# Patient Record
Sex: MALE | Race: WHITE | Employment: UNEMPLOYED | URBAN - METROPOLITAN AREA
[De-identification: names, ages, dates, MRNs, and addresses within clinical notes are randomized per-mention and may not be internally consistent; named-entity substitution may affect disease eponyms.]

---

## 2021-01-25 NOTE — PROGRESS NOTES
Chief Complaint - Neck mass    History of Present Illness - Efrain Mccollum is a 56 year old male with a left neck mass. It has been present for 2 months. The patient notes mild tenderness or pain. No changes in size. He feels it is more crowded in throat. The patient denies any facial numbness, history of skin cancer or any cancer. No dysphagia, odynphagia, or hoarseness. The mass does not change with eating. No obvious signs of infection including fluctuating size, redness, or purulent drainage. Treatments have included some anti-inflammatories. The patient is a former smoker, and used chew as a kid, nothing recently. I personally reviewed the relevant clinical notes in Epic including the primary care providers note. CT neck 1/4/2021 at [place] said no abnormality specifically with the major salivary glands. He had an U/S as well that noted a left In the area of palpable concern within left cervical level 2, there is a 1.8 x 0.7 x 0.8 cm lymph node with preserved fatty hilum.    Past Medical History -   - some osteoarthritis    Allergies - No Known Allergies    Social History -   Social History     Socioeconomic History     Marital status:      Spouse name: Not on file     Number of children: Not on file     Years of education: Not on file     Highest education level: Not on file   Occupational History     Not on file   Social Needs     Financial resource strain: Not on file     Food insecurity     Worry: Not on file     Inability: Not on file     Transportation needs     Medical: Not on file     Non-medical: Not on file   Tobacco Use     Smoking status: Not on file   Substance and Sexual Activity     Alcohol use: Not on file     Drug use: Not on file     Sexual activity: Not on file   Lifestyle     Physical activity     Days per week: Not on file     Minutes per session: Not on file     Stress: Not on file   Relationships     Social connections     Talks on phone: Not on file     Gets together: Not on  file     Attends [employer] service: Not on file     Active member of club or organization: Not on file     Attends meetings of clubs or organizations: Not on file     Relationship status: Not on file     Intimate partner violence     Fear of current or ex partner: Not on file     Emotionally abused: Not on file     Physically abused: Not on file     Forced sexual activity: Not on file   Other Topics Concern     Not on file   Social History Narrative     Not on file     Review of Systems - As per HPI and PMHx, otherwise 7 system review of the head and neck is negative.    Physical Exam  /87   Pulse 64   SpO2 98%   General - The patient is in no distress.  Alert and oriented x3, answers questions and cooperates with examination appropriately.   Voice and Breathing - The patient was breathing comfortably without the use of accessory muscles. There was no wheezing, stridor, or stertor.  The patients voice was clear and strong.  Eyes - Extraocular movements intact. Sclera were not icteric or injected, conjunctiva were pink and moist.  Neurologic - Cranial nerves II-XII are grossly intact. Specifically, the facial nerve is intact, House-Brackmann grade 1 of 6.   Mouth - Examination of the oral cavity showed pink, healthy oral mucosa. No lesions or ulcerations noted.  The tongue was mobile and protrudes midline, no lesions. Clear saliva comes out of both Warthon's ducts.  Oropharynx - The walls of the oropharynx were smooth, symmetric, and had no lesions or ulcerations.  The tonsils were without masses or ulcerations. The uvula was midline and the palate raised symmetrically.   Neck - Palpation of the left submandibular neck reveals a normal appearing submandibular gland. It does hang lower and/or is larger than the right submandibular gland. It is mobile, but firm. Some tenderness. No overlying skin changes. No fluctuance. No obvious mass.  I cannot tell if there is any adjacent lymph node to this or on it that  makes it feel like it is enlarged.  It is not firm to suggest infection or sialadenitis.  The other occipital, submental, submandibular, internal jugular chain, and supraclavicular chains did not demonstrate any abnormal lymph nodes or masses. No parotid masses. Palpation of the thyroid was soft and smooth, with no nodules or goiter appreciated.  The trachea was midline.        A/P - Efrain Mccollum is a 56 year old male with asymmetric submandibular enlargement and pain.  This was on the left side.  I do feel both submandibular glands in his left does feel slightly bigger than the right.  I cannot explain his the pain.  I do not appreciate any stone as he has normal saliva flow.  He also had a CT neck and ultrasound elsewhere that showed no obvious stone or submandibular abnormality or mass.  I only reviewed the report and happy to review the actual images.  He will get a CD with CT neck images for me and bring it to me to review.  The ultrasound showed a normal lymph node in level 2, and no obvious abnormality of the submandibular gland.  Possible he could have a sore swollen lymph node.  Other considerations could be to repeat the CT scan.  I did explain that this could be muscle skeletal.  It is possible the tender lymph node gland may not resolve with medical treatment.  Surgical treatment could be considered but without obvious pathology and may not be warranted.       Paddy Shah MD  Otolaryngology  [facility]

## 2021-01-26 ENCOUNTER — OFFICE VISIT (OUTPATIENT)
Dept: OTOLARYNGOLOGY | Facility: CLINIC | Age: 56
End: 2021-01-26
Payer: COMMERCIAL

## 2021-01-26 VITALS — OXYGEN SATURATION: 98 % | SYSTOLIC BLOOD PRESSURE: 122 MMHG | HEART RATE: 64 BPM | DIASTOLIC BLOOD PRESSURE: 87 MMHG

## 2021-01-26 DIAGNOSIS — K11.8 PAIN OF SUBMANDIBULAR GLAND: Primary | ICD-10-CM

## 2021-01-26 PROCEDURE — 99203 OFFICE O/P NEW LOW 30 MIN: CPT | Performed by: OTOLARYNGOLOGY

## 2021-01-26 NOTE — LETTER
1/26/2021         RE: Efrain Mccollum  [address] [address]        Dear Colleague,    Thank you for referring your patient, Efrain Mccollum, to the [facility]. Please see a copy of my visit note below.    Chief Complaint - Neck mass    History of Present Illness - Efrain Mccollum is a 56 year old male with a left neck mass. It has been present for 2 months. The patient notes mild tenderness or pain. No changes in size. He feels it is more crowded in throat. The patient denies any facial numbness, history of skin cancer or any cancer. No dysphagia, odynphagia, or hoarseness. The mass does not change with eating. No obvious signs of infection including fluctuating size, redness, or purulent drainage. Treatments have included some anti-inflammatories. The patient is a former smoker, and used chew as a kid, nothing recently. I personally reviewed the relevant clinical notes in Epic including the primary care providers note. CT neck 1/4/2021 at [place] said no abnormality specifically with the major salivary glands. He had an U/S as well that noted a left In the area of palpable concern within left cervical level 2, there is a 1.8 x 0.7 x 0.8 cm lymph node with preserved fatty hilum.    Past Medical History -   - some osteoarthritis    Allergies - No Known Allergies    Social History -   Social History     Socioeconomic History     Marital status:      Spouse name: Not on file     Number of children: Not on file     Years of education: Not on file     Highest education level: Not on file   Occupational History     Not on file   Social Needs     Financial resource strain: Not on file     Food insecurity     Worry: Not on file     Inability: Not on file     Transportation needs     Medical: Not on file     Non-medical: Not on file   Tobacco Use     Smoking status: Not on file   Substance and Sexual Activity     Alcohol use: Not on file     Drug use: Not on file      Sexual activity: Not on file   Lifestyle     Physical activity     Days per week: Not on file     Minutes per session: Not on file     Stress: Not on file   Relationships     Social connections     Talks on phone: Not on file     Gets together: Not on file     Attends [employer] service: Not on file     Active member of club or organization: Not on file     Attends meetings of clubs or organizations: Not on file     Relationship status: Not on file     Intimate partner violence     Fear of current or ex partner: Not on file     Emotionally abused: Not on file     Physically abused: Not on file     Forced sexual activity: Not on file   Other Topics Concern     Not on file   Social History Narrative     Not on file     Review of Systems - As per HPI and PMHx, otherwise 7 system review of the head and neck is negative.    Physical Exam  /87   Pulse 64   SpO2 98%   General - The patient is in no distress.  Alert and oriented x3, answers questions and cooperates with examination appropriately.   Voice and Breathing - The patient was breathing comfortably without the use of accessory muscles. There was no wheezing, stridor, or stertor.  The patients voice was clear and strong.  Eyes - Extraocular movements intact. Sclera were not icteric or injected, conjunctiva were pink and moist.  Neurologic - Cranial nerves II-XII are grossly intact. Specifically, the facial nerve is intact, House-Brackmann grade 1 of 6.   Mouth - Examination of the oral cavity showed pink, healthy oral mucosa. No lesions or ulcerations noted.  The tongue was mobile and protrudes midline, no lesions. Clear saliva comes out of both Warthon's ducts.  Oropharynx - The walls of the oropharynx were smooth, symmetric, and had no lesions or ulcerations.  The tonsils were without masses or ulcerations. The uvula was midline and the palate raised symmetrically.   Neck - Palpation of the left submandibular neck reveals a normal appearing submandibular  gland. It does hang lower and/or is larger than the right submandibular gland. It is mobile, but firm. Some tenderness. No overlying skin changes. No fluctuance. No obvious mass.  I cannot tell if there is any adjacent lymph node to this or on it that makes it feel like it is enlarged.  It is not firm to suggest infection or sialadenitis.  The other occipital, submental, submandibular, internal jugular chain, and supraclavicular chains did not demonstrate any abnormal lymph nodes or masses. No parotid masses. Palpation of the thyroid was soft and smooth, with no nodules or goiter appreciated.  The trachea was midline.        A/P - Efrain Mccollum is a 56 year old male with asymmetric submandibular enlargement and pain.  This was on the left side.  I do feel both submandibular glands in his left does feel slightly bigger than the right.  I cannot explain his the pain.  I do not appreciate any stone as he has normal saliva flow.  He also had a CT neck and ultrasound elsewhere that showed no obvious stone or submandibular abnormality or mass.  I only reviewed the report and happy to review the actual images.  He will get a CD with CT neck images for me and bring it to me to review.  The ultrasound showed a normal lymph node in level 2, and no obvious abnormality of the submandibular gland.  Possible he could have a sore swollen lymph node.  Other considerations could be to repeat the CT scan.  I did explain that this could be muscle skeletal.  It is possible the tender lymph node gland may not resolve with medical treatment.  Surgical treatment could be considered but without obvious pathology and may not be warranted.       Paddy Shah MD  Otolaryngology  [facility]        Again, thank you for allowing me to participate in the care of your patient.        Sincerely,        Paddy Shah MD

## 2021-03-15 ENCOUNTER — TELEPHONE (OUTPATIENT)
Dept: OTOLARYNGOLOGY | Facility: CLINIC | Age: 56
End: 2021-03-15

## 2021-03-15 NOTE — TELEPHONE ENCOUNTER
Patient states he requested for [employer] to send a CD with his previous images to Dr. Shah.    Patient wondering if Dr. Shah received this disc? Please call to inform.    Giancarlo Hardwick    [facility]

## 2021-03-16 NOTE — TELEPHONE ENCOUNTER
Fax cover sent to [facility] recs to request imaging be pushed to [facility] pacs system.      Stormy ORDAZ RN Specialty Triage 3/16/2021 11:05 AM

## 2021-03-22 NOTE — TELEPHONE ENCOUNTER
Dr Shah his imaging should be avail geeta in pacs for you to view.    Stormy ORDAZ RN Specialty Triage 3/22/2021 2:55 PM

## 2021-03-24 DIAGNOSIS — M24.20 EAGLE'S SYNDROME: Primary | ICD-10-CM

## 2021-03-24 NOTE — PROGRESS NOTES
CT neck reviewed in our PACS system from [facility]. The patient has an elongated and calcified styloid process on the left essentially going all the way down to his hyoid bone. In further question he gets worse pain with turning or tilting his head. He feels it affects his blood pressure and that maybe compression of his carotid artery. 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 I recommend referral to the  of  head and neck surgery department to discuss surgical options for this. Referral provided.

## 2021-03-25 NOTE — TELEPHONE ENCOUNTER
FUTURE VISIT INFORMATION      FUTURE VISIT INFORMATION:    Date: 4/14/2021    Time: 7AM    [place] [facility] [place] INFORMATION:    Referring provider:  Paddy Shah MD    Referring providers clinic:  [facility]     Reason for visit/diagnosis  [place] Syndrome    RECORDS REQUESTED FROM:       Clinic name Comments Records Status Imaging Status   [facility]  1/26/2021 note from Paddy Shah MD Epic    [facility] [place] Neck  Care everywhere  PACS   [facility]  12/31/2020 note from Esther Webb PA-C   Care Everywhere     [facility]   12/17/2020 note from Eris Pleitez PA-C   Care everywhere

## 2021-03-26 ENCOUNTER — DOCUMENTATION ONLY (OUTPATIENT)
Dept: CARE COORDINATION | Facility: CLINIC | Age: 56
End: 2021-03-26

## 2021-04-12 NOTE — PATIENT INSTRUCTIONS
1. You were seen in the ENT Clinic today by Dr. Varela.  If you have any questions or concerns after your appointment, please call   - Option 1: ENT Clinic: [phone]   - Option 2: Nevaeh (Dr. Varela's Nurse): [phone]     2.   Plan to return to clinic in person    Nevaeh Bustillo LPN  [facility] - Otolaryngology

## 2021-04-14 ENCOUNTER — TELEPHONE (OUTPATIENT)
Dept: OTOLARYNGOLOGY | Facility: CLINIC | Age: 56
End: 2021-04-14

## 2021-04-14 ENCOUNTER — VIRTUAL VISIT (OUTPATIENT)
Dept: OTOLARYNGOLOGY | Facility: CLINIC | Age: 56
End: 2021-04-14
Attending: OTOLARYNGOLOGY
Payer: COMMERCIAL

## 2021-04-14 ENCOUNTER — PRE VISIT (OUTPATIENT)
Dept: OTOLARYNGOLOGY | Facility: CLINIC | Age: 56
End: 2021-04-14

## 2021-04-14 VITALS — WEIGHT: 217 LBS

## 2021-04-14 DIAGNOSIS — M24.20 EAGLE'S SYNDROME: Primary | ICD-10-CM

## 2021-04-14 PROCEDURE — 99213 OFFICE O/P EST LOW 20 MIN: CPT | Mod: 95 | Performed by: OTOLARYNGOLOGY

## 2021-04-14 RX ORDER — CHLORHEXIDINE GLUCONATE ORAL RINSE 1.2 MG/ML
SOLUTION DENTAL
COMMUNITY
Start: 2021-03-16

## 2021-04-14 RX ORDER — IBUPROFEN 400 MG/1
TABLET, FILM COATED ORAL
COMMUNITY
Start: 2020-12-17

## 2021-04-14 RX ORDER — ACETAMINOPHEN 500 MG
TABLET ORAL
COMMUNITY
Start: 2020-12-17

## 2021-04-14 ASSESSMENT — PAIN SCALES - GENERAL: PAINLEVEL: EXTREME PAIN (8)

## 2021-04-14 NOTE — PROGRESS NOTES
Efrain is a 56 year old who is being evaluated via a billable telephone visit.      What phone number would you like to be contacted at? [phone]  How would you like to obtain your AVS? MyChart        HPI         Review of Systems            Physical Exam       See dictation        Phone call duration: 10 minutes

## 2021-04-14 NOTE — PROGRESS NOTES
HISTORY OF PRESENT ILLNESS: Efrain Mccollum a 56-year-old gentleman who was seen by telephone visit today.  His video was not functional.  Therefore we changed this to a phone visit.  The phone visit  took approximately 10 minutes.        Efrain Mccollum has experienced right-sided neck pain and has been seen by an otolaryngologist for this.  CT scan was obtained that showed calcification of the stylohyoid ligament.  This is inconsequential and that it could be [place] syndrome.      He describes the pain as sharp, fleeting and more when swallowing or turning his head.  He did have some associated adenopathy, but this has not been proven to be significantly more related to any intraoral issues.      PAST MEDICAL HISTORY:  Reviewed.      MEDICATIONS:  Reviewed.      ALLERGIES:  REVIEWED.      REVIEW OF SYSTEMS:  Notes mild odynophagia, but no dysphagia.  He has no shortness of breath, fever or chills.      REVIEW OF SYSTEMS:  As above.  He is a nonsmoker and does not drink alcohol.  He does happen to be starting a new job and is worried about time off.      PHYSICAL EXAMINATION:  Deferred as this is a phone call.      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      PLAN:  We will have him visit with us so we can assess diagnostically, exactly the reason for his pain. and review his CT scan. We will schedule accordingly.

## 2021-04-14 NOTE — LETTER
4/14/2021       RE: Efrain Mccollum  [address] [address]     Dear Colleague,    Thank you for referring your patient, Efrain Mccollum, to the [facility] [place] at [facility]. Please see a copy of my visit note below.      Phone call duration: 10 minutes      HISTORY OF PRESENT ILLNESS: Efrain Mccollum a 56-year-old gentleman who was seen by telephone visit today.  His video was not functional.  Therefore we changed this to a phone visit.  The phone visit  took approximately 10 minutes.        Efrain Mccollum has experienced right-sided neck pain and has been seen by an otolaryngologist for this.  CT scan was obtained that showed calcification of the stylohyoid ligament.  This is inconsequential and that it could be [place] syndrome.      He describes the pain as sharp, fleeting and more when swallowing or turning his head.  He did have some associated adenopathy, but this has not been proven to be significantly more related to any intraoral issues.      PAST MEDICAL HISTORY:  Reviewed.      MEDICATIONS:  Reviewed.      ALLERGIES:  REVIEWED.      REVIEW OF SYSTEMS:  Notes mild odynophagia, but no dysphagia.  He has no shortness of breath, fever or chills.      REVIEW OF SYSTEMS:  As above.  He is a nonsmoker and does not drink alcohol.  He does happen to be starting a new job and is worried about time off.      PHYSICAL EXAMINATION:  Deferred as this is a phone call.      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      PLAN:  We will have him visit with us so we can assess diagnostically, exactly the reason for his pain. and review his CT scan. We will schedule accordingly.       Again, thank you for allowing me to participate in the care of your patient.      Sincerely,    Greg Varela MD

## 2021-05-01 ENCOUNTER — HEALTH MAINTENANCE LETTER (OUTPATIENT)
Age: 56
End: 2021-05-01

## 2021-05-10 NOTE — PATIENT INSTRUCTIONS
1. You were seen in the ENT Clinic today by Dr. Varela.  If you have any questions or concerns after your appointment, please call   - Option 1: ENT Clinic: [phone]   - Option 2: Nevaeh (Dr. Varela's Nurse): [phone]     Surgery Teaching      1.You must have a physical exam (called  history and physical ) within 30 days of surgery. You can do this at the PAC clinic or your family clinic.     2.For same-day surgery, you must arrange for an adult to take you home from the Center. An adult must stay with you for the first 24 hours after surgery. You cannot drive for 24 hours.     3. Ask your doctor what medicines are safe before surgery.     4. Stop drinking alcohol at least 24 hours before surgery.     5. Stop or at least cut down on smoking 24 hours before surgery.    6.Take a bath or shower the night before and the morning of surgery (as told by your surgeon). Use an antiseptic soap. If your doctor does not give you special soap, buy Hibiclens or [place]-Stat at the drug store or ask the pharmacist to suggest a brand.  Do not put on lotion, powder, perfume, deodorant or make-up after bathing.    7. You can eat a normal meal the night before surgery. Do not eat any solid foods or drink any milk products for 8 hours before surgery.     8. You may drink clear liquids until 2 hours before surgery. Clear liquids include water, Gatorade, apple juice and liquids you can read through.    9. NO MOTRIN, IBUPROFEN, ASPIRIN, ALEVE, GARLIC SUPPLEMENTS or FISH OIL x 7 days prior to surgery ( to prevent excess bleeding and bruising at time of surgery)      Nevaeh Bustillo LPN  [facility] - Otolaryngology

## 2021-05-12 ENCOUNTER — PREP FOR PROCEDURE (OUTPATIENT)
Dept: OTOLARYNGOLOGY | Facility: CLINIC | Age: 56
End: 2021-05-12

## 2021-05-12 ENCOUNTER — OFFICE VISIT (OUTPATIENT)
Dept: OTOLARYNGOLOGY | Facility: CLINIC | Age: 56
End: 2021-05-12
Payer: COMMERCIAL

## 2021-05-12 VITALS
HEIGHT: 72 IN | TEMPERATURE: 97.3 F | DIASTOLIC BLOOD PRESSURE: 93 MMHG | HEART RATE: 76 BPM | SYSTOLIC BLOOD PRESSURE: 135 MMHG | BODY MASS INDEX: 30.16 KG/M2 | OXYGEN SATURATION: 98 % | WEIGHT: 222.66 LBS | RESPIRATION RATE: 17 BRPM

## 2021-05-12 DIAGNOSIS — M24.20 EAGLE'S SYNDROME: Primary | ICD-10-CM

## 2021-05-12 PROCEDURE — 99212 OFFICE O/P EST SF 10 MIN: CPT | Performed by: OTOLARYNGOLOGY

## 2021-05-12 RX ORDER — CYCLOBENZAPRINE HCL 10 MG
10 TABLET ORAL 3 TIMES DAILY PRN
COMMUNITY

## 2021-05-12 RX ORDER — DEXAMETHASONE SODIUM PHOSPHATE 4 MG/ML
10 INJECTION, SOLUTION INTRA-ARTICULAR; INTRALESIONAL; INTRAMUSCULAR; INTRAVENOUS; SOFT TISSUE ONCE
Status: CANCELLED | OUTPATIENT
Start: 2021-05-12 | End: 2021-05-12

## 2021-05-12 ASSESSMENT — PAIN SCALES - GENERAL: PAINLEVEL: EXTREME PAIN (9)

## 2021-05-12 ASSESSMENT — MIFFLIN-ST. JEOR: SCORE: 1883

## 2021-05-12 NOTE — LETTER
5/12/2021       RE: Efrain Mccollum  [address] [address]     Dear Colleague,    Thank you for referring your patient, Efrain Mccollum, to the [facility]SE [facility] TH[facility] [place] at [facility]. Please see a copy of my visit note below.    HISTORY OF PRESENT ILLNESS:  Efrain Mccollum is a 56-year-old gentleman who has had longstanding left sided neck pain.  He has had workups by both his primary care physician and specialist and was last seen at the ED at [facility].  Pain is debilitating.  He says it is probably 8-9 at its most intense, but also chronic somewhat throughout the day.  It is exacerbated by turning his head and sometimes swallowing.  He has no dysphagia or odynophagia.  He has no shortness of breath, fever or chills.    Part of the workup was a CT scan.  CT scan showed a left elongated styloid process.  He was given a tentative diagnosis of Eagle syndrome and is sent here for workup.    PAST MEDICAL HISTORY: Reviewed.    MEDICATIONS: Reviewed.      ALLERGIES:  Reviewed.      REVIEW OF SYSTEMS:  Significant only for the above.    PHYSICAL EXAMINATION:  Examination shows tympanic membranes intact and mobile.  Nares are patent.  Oral cavity is unremarkable without lesions or masses.  Neck is supple There is no adenopathy, but he does have soreness along the left sternocleidomastoid muscle.    IMAGING:  He has calcification of almost the entire course of his left stylohyoid ligament.  The same is on the right side, but not to the extent.    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    PLAN:  Having exhausted medical therapies, he has now opted for surgery, which would be a transoral fracture of the styloid process.  He understands risks and benefits and wishes to proceed.  We will schedule accordingly.          Again, thank you for allowing me to participate in the care of your patient.       Sincerely,    Greg Varela MD

## 2021-05-12 NOTE — NURSING NOTE
Chief Complaint   Patient presents with     RECHECK     eagle's syndrome    '  Blood pressure (!) 135/93, pulse 76, temperature 97.3  F (36.3  C), resp. rate 17, height 1.829 m (6'), weight 101 kg (222 lb 10.6 oz), SpO2 98 %.    Martin Potter LPN

## 2021-05-12 NOTE — PROGRESS NOTES
HISTORY OF PRESENT ILLNESS:  Efrain Mccollum is a 56-year-old gentleman who has had longstanding left sided neck pain.  He has had workups by both his primary care physician and specialist and was last seen at the ED at [facility].  Pain is debilitating.  He says it is probably 8-9 at its most intense, but also chronic somewhat throughout the day.  It is exacerbated by turning his head and sometimes swallowing.  He has no dysphagia or odynophagia.  He has no shortness of breath, fever or chills.    Part of the workup was a CT scan.  CT scan showed a left elongated styloid process.  He was given a tentative diagnosis of Eagle syndrome and is sent here for workup.    PAST MEDICAL HISTORY: Reviewed.    MEDICATIONS: Reviewed.      ALLERGIES:  Reviewed.      REVIEW OF SYSTEMS:  Significant only for the above.    PHYSICAL EXAMINATION:  Examination shows tympanic membranes intact and mobile.  Nares are patent.  Oral cavity is unremarkable without lesions or masses.  Neck is supple There is no adenopathy, but he does have soreness along the left sternocleidomastoid muscle.    IMAGING:  He has calcification of almost the entire course of his left stylohyoid ligament.  The same is on the right side, but not to the extent.    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    PLAN:  Having exhausted medical therapies, he has now opted for surgery, which would be a transoral fracture of the styloid process.  He understands risks and benefits and wishes to proceed.  We will schedule accordingly.

## 2021-05-17 ENCOUNTER — TELEPHONE (OUTPATIENT)
Dept: OTOLARYNGOLOGY | Facility: CLINIC | Age: 56
End: 2021-05-17

## 2021-05-17 NOTE — TELEPHONE ENCOUNTER
Left message regarding scheduling surgery/procedure with Dr. Varela.   Writer left call back number on the patients voicemail       Jacklyn Bruner on 5/17/2021 at 3:09 PM   P: [phone]

## 2021-10-11 ENCOUNTER — HEALTH MAINTENANCE LETTER (OUTPATIENT)
Age: 56
End: 2021-10-11

## 2021-10-20 ENCOUNTER — IMMUNIZATION (OUTPATIENT)
Dept: FAMILY MEDICINE | Facility: CLINIC | Age: 56
End: 2021-10-20
Payer: COMMERCIAL

## 2021-10-20 PROCEDURE — 90682 RIV4 VACC RECOMBINANT DNA IM: CPT

## 2021-10-20 PROCEDURE — 90471 IMMUNIZATION ADMIN: CPT

## 2022-05-18 ENCOUNTER — APPOINTMENT (OUTPATIENT)
Dept: URBAN - METROPOLITAN AREA CLINIC 259 | Age: 57
Setting detail: DERMATOLOGY
End: 2022-05-23

## 2022-05-18 DIAGNOSIS — L40.0 PSORIASIS VULGARIS: ICD-10-CM

## 2022-05-18 PROCEDURE — OTHER PRESCRIPTION: OTHER

## 2022-05-18 PROCEDURE — OTHER COUNSELING: OTHER

## 2022-05-18 PROCEDURE — OTHER ADDITIONAL NOTES: OTHER

## 2022-05-18 PROCEDURE — 99203 OFFICE O/P NEW LOW 30 MIN: CPT

## 2022-05-18 PROCEDURE — OTHER PHOTO-DOCUMENTATION: OTHER

## 2022-05-18 RX ORDER — BETAMETHASONE DIPROPIONATE 0.5 MG/G
OINTMENT TOPICAL
Qty: 15 | Refills: 3 | Status: ERX | COMMUNITY
Start: 2022-05-18

## 2022-05-18 ASSESSMENT — LOCATION ZONE DERM
LOCATION ZONE: ARM
LOCATION ZONE: TRUNK

## 2022-05-18 ASSESSMENT — LOCATION DETAILED DESCRIPTION DERM
LOCATION DETAILED: LEFT PROXIMAL RADIAL DORSAL FOREARM
LOCATION DETAILED: LEFT ELBOW
LOCATION DETAILED: RIGHT ELBOW
LOCATION DETAILED: UMBILICUS

## 2022-05-18 ASSESSMENT — LOCATION SIMPLE DESCRIPTION DERM
LOCATION SIMPLE: ABDOMEN
LOCATION SIMPLE: LEFT FOREARM
LOCATION SIMPLE: LEFT ELBOW
LOCATION SIMPLE: RIGHT ELBOW

## 2022-05-18 NOTE — PROCEDURE: ADDITIONAL NOTES
Additional Notes: Lesion specifically on left lower arm may be a spot of eczema\\nPatient states that his psoriasis is adequately controlled with topical steroids
Render Risk Assessment In Note?: no
Detail Level: Simple

## 2022-05-18 NOTE — HPI: RASH (PSORIASIS)
How Severe Is Your Psoriasis?: moderate
Is This A New Presentation, Or A Follow-Up?: Psoriasis
Additional History: Patient reports that he received a topical medication from us about 3 years ago for his psoriasis. Location of psoriasis is umbilical region, elbows. He states that it’s well under control with topicals. He presents today for an evaluation of a dry, red, annular lesion on his left forearm. It has been there for a year and comes and goes. He would like lesion evaluated and assessed, as he is unsure if it relates to his psoriasis.

## 2022-05-22 ENCOUNTER — HEALTH MAINTENANCE LETTER (OUTPATIENT)
Age: 57
End: 2022-05-22

## 2022-09-25 ENCOUNTER — HEALTH MAINTENANCE LETTER (OUTPATIENT)
Age: 57
End: 2022-09-25

## 2022-11-16 ENCOUNTER — IMMUNIZATION (OUTPATIENT)
Dept: FAMILY MEDICINE | Facility: CLINIC | Age: 57
End: 2022-11-16
Payer: COMMERCIAL

## 2022-11-16 DIAGNOSIS — Z23 NEED FOR PROPHYLACTIC VACCINATION AND INOCULATION AGAINST INFLUENZA: Primary | ICD-10-CM

## 2022-11-16 PROCEDURE — 99207 PR NO CHARGE NURSE ONLY: CPT

## 2022-11-16 PROCEDURE — 90471 IMMUNIZATION ADMIN: CPT

## 2022-11-16 PROCEDURE — 90682 RIV4 VACC RECOMBINANT DNA IM: CPT

## 2023-06-04 ENCOUNTER — HEALTH MAINTENANCE LETTER (OUTPATIENT)
Age: 58
End: 2023-06-04

## 2023-11-07 ENCOUNTER — IMMUNIZATION (OUTPATIENT)
Dept: FAMILY MEDICINE | Facility: CLINIC | Age: 58
End: 2023-11-07

## 2023-11-07 DIAGNOSIS — Z23 ENCOUNTER FOR IMMUNIZATION: Primary | ICD-10-CM

## 2023-11-07 PROCEDURE — 90686 IIV4 VACC NO PRSV 0.5 ML IM: CPT

## 2023-11-07 PROCEDURE — 99207 PR NO CHARGE NURSE ONLY: CPT

## 2023-11-07 PROCEDURE — 90471 IMMUNIZATION ADMIN: CPT

## 2023-11-07 NOTE — PROGRESS NOTES
Prior to immunization administration, verified patients identity using patient s name and date of birth. Please see Immunization Activity for additional information.     Screening Questionnaire for Adult Immunization    Are you sick today?   No   Do you have allergies to medications, food, a vaccine component or latex?   No   Have you ever had a serious reaction after receiving a vaccination?   No   Do you have a long-term health problem with heart, lung, kidney, or metabolic disease (e.g., diabetes), asthma, a blood disorder, no spleen, complement component deficiency, a cochlear implant, or a spinal fluid leak?  Are you on long-term aspirin therapy?   No   Do you have cancer, leukemia, HIV/AIDS, or any other immune system problem?   No   Do you have a parent, brother, or sister with an immune system problem?   No   In the past 3 months, have you taken medications that affect  your immune system, such as prednisone, other steroids, or anticancer drugs; drugs for the treatment of rheumatoid arthritis, Crohn s disease, or psoriasis; or have you had radiation treatments?   No   Have you had a seizure, or a brain or other nervous system problem?   No   During the past year, have you received a transfusion of blood or blood    products, or been given immune (gamma) globulin or antiviral drug?   No   For women: Are you pregnant or is there a chance you could become       pregnant during the next month?   No   Have you received any vaccinations in the past 4 weeks?   No     Immunization questionnaire answers were all negative.    I have reviewed the following standing orders:   This patient is due and qualifies for the Influenza vaccine.    Click here for Influenza Vaccine Standing Order    I have reviewed the vaccines inclusion and exclusion criteria; No concerns regarding eligibility.     Patient instructed to remain in clinic for 15 minutes afterwards, and to report any adverse reactions.     Screening performed by  Renee Glez MA on 11/7/2023 at 8:26 AM.

## 2024-07-20 ENCOUNTER — HEALTH MAINTENANCE LETTER (OUTPATIENT)
Age: 59
End: 2024-07-20

## 2025-08-09 ENCOUNTER — HEALTH MAINTENANCE LETTER (OUTPATIENT)
Age: 60
End: 2025-08-09